# Patient Record
Sex: MALE | Race: WHITE | URBAN - METROPOLITAN AREA
[De-identification: names, ages, dates, MRNs, and addresses within clinical notes are randomized per-mention and may not be internally consistent; named-entity substitution may affect disease eponyms.]

---

## 2023-06-08 ENCOUNTER — OFFICE VISIT (OUTPATIENT)
Dept: URBAN - METROPOLITAN AREA CLINIC 6 | Facility: CLINIC | Age: 48
End: 2023-06-08
Payer: COMMERCIAL

## 2023-06-08 DIAGNOSIS — H25.13 AGE-RELATED NUCLEAR CATARACT, BILATERAL: ICD-10-CM

## 2023-06-08 DIAGNOSIS — E11.3291 DIABETES MELLITUS TYPE 2 WITH MILD NON-PROLIFERATIVE RETINOPATHY WITHOUT MACULAR EDEMA, RIGHT EYE: ICD-10-CM

## 2023-06-08 DIAGNOSIS — H11.042 PERIPHERAL PTERYGIUM, STATIONARY, LEFT EYE: ICD-10-CM

## 2023-06-08 DIAGNOSIS — H04.123 TEAR FILM INSUFFICIENCY OF BILATERAL LACRIMAL GLANDS: ICD-10-CM

## 2023-06-08 DIAGNOSIS — E11.3592 DIABETES MELLITUS TYPE 2 WITH PROLIFERATIVE RETINOPATHY WITHOUT MACULAR EDEMA, LEFT EYE: Primary | ICD-10-CM

## 2023-06-08 PROCEDURE — 92250 FUNDUS PHOTOGRAPHY W/I&R: CPT | Performed by: OPTOMETRIST

## 2023-06-08 PROCEDURE — 99204 OFFICE O/P NEW MOD 45 MIN: CPT | Performed by: OPTOMETRIST

## 2023-06-08 ASSESSMENT — INTRAOCULAR PRESSURE
OD: 13
OS: 12

## 2023-06-08 NOTE — IMPRESSION/PLAN
Impression: Diabetes mellitus Type 2 with mild non-proliferative retinopathy without macular edema, right eye: Q54.6449. Plan: see above.

## 2023-06-08 NOTE — IMPRESSION/PLAN
Impression: Diabetes mellitus Type 2 with proliferative retinopathy without macular edema, left eye: [id number]. Plan: Diabetes Mellitus Type II with Proliferative Diabetic Retinopathy with absence of  macular edema -ordered FP/mOCt flat OU. Discussed the pathophysiology of diabetes and its effect on the eye. Stressed the importance of strong glucose control. Advised of importance of scheduled dilated examinations, and to contact us immediately for any problems or concerns. Patient was referred to a retina specialist for further evaluation and management.